# Patient Record
Sex: MALE | Race: WHITE | Employment: FULL TIME | URBAN - METROPOLITAN AREA
[De-identification: names, ages, dates, MRNs, and addresses within clinical notes are randomized per-mention and may not be internally consistent; named-entity substitution may affect disease eponyms.]

---

## 2019-06-16 ENCOUNTER — APPOINTMENT (OUTPATIENT)
Dept: GENERAL RADIOLOGY | Age: 49
End: 2019-06-16
Payer: COMMERCIAL

## 2019-06-16 ENCOUNTER — HOSPITAL ENCOUNTER (EMERGENCY)
Age: 49
Discharge: HOME | End: 2019-06-16
Attending: EMERGENCY MEDICINE
Payer: COMMERCIAL

## 2019-06-16 VITALS
BODY MASS INDEX: 40.63 KG/M2 | HEIGHT: 72 IN | WEIGHT: 300 LBS | OXYGEN SATURATION: 99 % | RESPIRATION RATE: 16 BRPM | HEART RATE: 71 BPM | DIASTOLIC BLOOD PRESSURE: 94 MMHG | SYSTOLIC BLOOD PRESSURE: 159 MMHG | TEMPERATURE: 98.6 F

## 2019-06-16 DIAGNOSIS — M22.40 CHONDROMALACIA OF PATELLA, UNSPECIFIED LATERALITY: Primary | ICD-10-CM

## 2019-06-16 PROCEDURE — 73562 X-RAY EXAM OF KNEE 3: CPT

## 2019-06-16 PROCEDURE — 99283 EMERGENCY DEPT VISIT LOW MDM: CPT

## 2019-06-16 RX ORDER — LISINOPRIL 10 MG/1
10 TABLET ORAL DAILY
COMMUNITY

## 2019-06-16 RX ORDER — INDOMETHACIN 50 MG/1
50 CAPSULE ORAL 2 TIMES DAILY WITH MEALS
Qty: 20 CAPSULE | Refills: 0 | Status: SHIPPED | OUTPATIENT
Start: 2019-06-16

## 2019-06-16 RX ORDER — HYDROCODONE BITARTRATE AND ACETAMINOPHEN 5; 325 MG/1; MG/1
1 TABLET ORAL EVERY 6 HOURS PRN
Qty: 10 TABLET | Refills: 0 | Status: SHIPPED | OUTPATIENT
Start: 2019-06-16 | End: 2019-06-19

## 2019-06-16 ASSESSMENT — PAIN DESCRIPTION - ORIENTATION: ORIENTATION: LEFT;UPPER

## 2019-06-16 ASSESSMENT — PAIN DESCRIPTION - LOCATION: LOCATION: KNEE

## 2019-06-16 ASSESSMENT — PAIN SCALES - GENERAL: PAINLEVEL_OUTOF10: 6

## 2019-06-16 ASSESSMENT — PAIN DESCRIPTION - DESCRIPTORS: DESCRIPTORS: DULL;CONSTANT

## 2019-06-16 NOTE — ED NOTES
Pt declined Chinita Rinne, MD notified, [address] [place] [address]. Patient cleared for discharge per MD. Patient discharge instructions explained, Rx given and explained to patient. Patient Verbalized understanding of all instructions and all patient questions answered to their satisfaction. Patient departs from ED in stable condition.         Patrizia Mars RN  06/16/19 4436

## 2019-06-16 NOTE — ED PROVIDER NOTES
[facility]  [address] [facility] [address]  Phone: [phone]  Fax: [phone]    Pt Name: mAbika Bates  MRN: [id number]  Armsjanicegfurt 1970  Date of evaluation: 6/16/2019      CHIEF COMPLAINT       Chief Complaint   Patient presents with    Knee Pain         HISTORY OF PRESENT ILLNESS     Ambika Bates is a 44 y.o. male who presents with left knee pain which began over the past 2-3 days. He's been occasionally kneeling is putting in a new floor doesn't recall any blunt trauma to his knee. He doesn't normally have any knee problems. He has no distal numbness. He denies any other symptoms. No hip or ankle pain. He brought himself here for care. He took Aleve and that has not helped. REVIEW OF SYSTEMS       Other ROS negative except as noted above. PAST MEDICAL HISTORY    has a past medical history of Hypertension. SURGICAL HISTORY      has no past surgical history on file. CURRENT MEDICATIONS       Previous Medications    LISINOPRIL (PRINIVIL;ZESTRIL) 10 MG TABLET    Take 10 mg by mouth daily       ALLERGIES     has No Known Allergies. FAMILY HISTORY     has no family status information on file. family history is not on file. SOCIAL HISTORY      reports that he has never smoked. He does not have any smokeless tobacco history on file. He reports that he drinks alcohol. PHYSICAL EXAM     INITIAL VITALS:  height is 6' (1.829 m) and weight is 136.1 kg (300 lb). His oral temperature is 98.6 °F (37 °C). His blood pressure is 159/94 (abnormal) and his pulse is 71. His respiration is 16 and oxygen saturation is 99%. Constitutional: The patient is alert, well-developed, in no acute distress. Vital signs as noted. Left knee demonstrates tenderness palpation of this. Portion of the patella. There is no joint effusion. Good range of motion of the knee. There is no other thigh or lower leg pain.       DIFFERENTIAL DIAGNOSIS/ MEDICAL DECISION MAKING: PER TABLET    Take 1 tablet by mouth every 6 hours as needed for Pain for up to 3 days.     INDOMETHACIN (INDOCIN) 50 MG CAPSULE    Take 1 capsule by mouth 2 times daily (with meals)       (Please note that portions of this note were completed with a voice recognition program.  Efforts were made to edit the dictations but occasionally words are mis-transcribed.)    Riley Holley,, DO  Attending Emergency Physician       [address] [place] [address],   06/16/19 0293

## 2019-06-27 ENCOUNTER — TELEPHONE (OUTPATIENT)
Dept: ORTHOPEDIC SURGERY | Age: 49
End: 2019-06-27

## 2021-10-22 NOTE — TELEPHONE ENCOUNTER
The patient says that his knee is better and he does not need a follow up appointment with an Orthopedic Physician. <--- Click to Launch [employer] for PreOp, PostOp and Procedure

## 2023-11-03 RX ORDER — LISINOPRIL 20 MG/1
20 TABLET ORAL DAILY
Qty: 90 TABLET | Refills: 3 | Status: SHIPPED | OUTPATIENT
Start: 2023-11-03

## 2023-11-03 NOTE — TELEPHONE ENCOUNTER
Benjy Hart is calling to request a refill on the following medication(s):    Medication Request:  Requested Prescriptions     Pending Prescriptions Disp Refills    lisinopril (PRINIVIL;ZESTRIL) 20 MG tablet [Pharmacy Med Name: LISINOPRIL 20 MG TABLET] 90 tablet      Sig: take 1 tablet by mouth once daily       Last Visit Date (If Applicable):  Visit date not found    Next Visit Date:    Visit date not found

## 2024-03-26 RX ORDER — SPIRONOLACTONE 25 MG/1
25 TABLET ORAL 2 TIMES DAILY WITH MEALS
Qty: 180 TABLET | Refills: 2 | Status: SHIPPED | OUTPATIENT
Start: 2024-03-26

## 2024-03-26 NOTE — TELEPHONE ENCOUNTER
Keith Brumfield is calling to request a refill on the following medication(s):    Medication Request:  Requested Prescriptions     Pending Prescriptions Disp Refills    spironolactone (ALDACTONE) 25 MG tablet [Pharmacy Med Name: SPIRONOLACTONE 25 MG TABLET] 180 tablet      Sig: take 1 tablet by mouth twice a day with food       Last Visit Date (If Applicable):  Visit date not found    Next Visit Date:    Visit date not found

## 2024-07-01 RX ORDER — AMLODIPINE BESYLATE 10 MG/1
10 TABLET ORAL DAILY
Qty: 90 TABLET | Refills: 1 | Status: SHIPPED | OUTPATIENT
Start: 2024-07-01

## 2024-07-01 NOTE — TELEPHONE ENCOUNTER
Keith Brumfield is calling to request a refill on the following medication(s):    Medication Request:  Requested Prescriptions     Pending Prescriptions Disp Refills    amLODIPine (NORVASC) 10 MG tablet [Pharmacy Med Name: AMLODIPINE BESYLATE 10 MG TAB] 90 tablet 1     Sig: take 1 tablet by mouth once daily       Last Visit Date (If Applicable):  Visit date not found    Next Visit Date:    Visit date not found

## 2025-01-22 ENCOUNTER — TELEPHONE (OUTPATIENT)
Age: 55
End: 2025-01-22

## 2025-01-22 RX ORDER — AMLODIPINE BESYLATE 10 MG/1
10 TABLET ORAL DAILY
Qty: 90 TABLET | Refills: 1 | Status: SHIPPED | OUTPATIENT
Start: 2025-01-22

## 2025-01-22 RX ORDER — SPIRONOLACTONE 25 MG/1
25 TABLET ORAL 2 TIMES DAILY WITH MEALS
Qty: 180 TABLET | Refills: 2 | Status: SHIPPED | OUTPATIENT
Start: 2025-01-22

## 2025-01-22 RX ORDER — LISINOPRIL 20 MG/1
20 TABLET ORAL DAILY
Qty: 90 TABLET | Refills: 3 | Status: SHIPPED | OUTPATIENT
Start: 2025-01-22

## 2025-01-22 NOTE — TELEPHONE ENCOUNTER
Keith Brumfield is calling to request a refill on the following medication(s):    Medication Request:  Requested Prescriptions     Pending Prescriptions Disp Refills    lisinopril (PRINIVIL;ZESTRIL) 20 MG tablet 90 tablet 3     Sig: Take 1 tablet by mouth daily    spironolactone (ALDACTONE) 25 MG tablet 180 tablet 2     Sig: Take 1 tablet by mouth 2 times daily (with meals)    amLODIPine (NORVASC) 10 MG tablet 90 tablet 1     Sig: Take 1 tablet by mouth daily       Last Visit Date (If Applicable):  Visit date not found    Next Visit Date:    Visit date not found

## 2025-01-22 NOTE — TELEPHONE ENCOUNTER
Patient is requesting refills on the following:  Lisinopril 10mg 1 daily  Spironolactone 25mg, 1 tab twice daily  Amlodipine 10mg 1 daily  Send to [facility] in [place]